# Patient Record
Sex: MALE | Race: WHITE | Employment: FULL TIME | URBAN - METROPOLITAN AREA
[De-identification: names, ages, dates, MRNs, and addresses within clinical notes are randomized per-mention and may not be internally consistent; named-entity substitution may affect disease eponyms.]

---

## 2019-03-22 ENCOUNTER — THERAPY VISIT (OUTPATIENT)
Dept: CHIROPRACTIC MEDICINE | Facility: CLINIC | Age: 34
End: 2019-03-22
Payer: COMMERCIAL

## 2019-03-22 DIAGNOSIS — M25.552 HIP PAIN, LEFT: ICD-10-CM

## 2019-03-22 DIAGNOSIS — M99.05 SOMATIC DYSFUNCTION OF PELVIS REGION: Primary | ICD-10-CM

## 2019-03-22 DIAGNOSIS — M79.10 MYALGIA: ICD-10-CM

## 2019-03-22 DIAGNOSIS — M25.562 CHRONIC PAIN OF LEFT KNEE: ICD-10-CM

## 2019-03-22 DIAGNOSIS — G89.29 CHRONIC PAIN OF LEFT KNEE: ICD-10-CM

## 2019-03-22 PROCEDURE — 99202 OFFICE O/P NEW SF 15 MIN: CPT | Mod: GA | Performed by: CHIROPRACTOR

## 2019-03-22 NOTE — PROGRESS NOTES
[facility]  Mar 22, 2019  My name is Maury Martinez and I was referred to you by my aunt, Angela Francois. She recommended that I reach out to you as her and her family have had great experiences working with you for athletic related injuries and pains.    I've been experiencing discomfort, pain, and tightness in the outside, lower part of my knee. The pain is manageable by limiting my activity level but I'd like to figure out what's specifically going on and how we can correct it. I'm fairly active playing soccer 2 times per week and participating in [employer] yoga or yoga sculpt 2 to 4 times per week. I'd like to also be able to run but that hasn't been possible for awhile now as the pain in the lower part of my knee creeps in rather quickly. The pain is deep within the knee and is mainly tingling and a deep, dull pain. It could possibly be ITB syndrome but I am fairly disciplined about stretching my calves, quads, and hamstrings, so I'm not sure whether that's it.    I should also mention that I saw a chiropractor this past fall and worked with him over nearly a dozen sessions. The symptoms did gradually improve but we never really figured out what was specifically happening and the issues never entirely went away. After talking with Angela, she thought it would make sense for me to reach out to you. Please let me know whether you have any availability for me to come in. Ideally, early morning, late afternoon, or early evening appointments work best with my schedule. I'm looking forward to hearing from you.    Pressure, tingling, worse sitting, pulled left glute area 10 years ago and gets some soreness, left calf tightness, left lateral hip  R 32/48    Subjective:  Maury Martinez   33 year old   male    CC: left lateral knee  Medications reviewed: denies  Visit: 1/6  Goal: stairs with no pain, stand for 30 minutes without pain, run back to back days  Location: L lateral knee  GABRIELA: 2.5 years after  soccer season, always achy sensation, doing too much  Pain: 4/10  Previous History: left hip strain, L calf tightness  Progression: slightly better  Quality: ache, tightness  Radiation:denies   Pain is worse with: running, standing for long period  Pain is better with: rest, massage  Timing: frequent pain   Under care: has seen chiropractor for this with some results  Imaging: denies  Social: alert, oriented, and active. Avid runner      Objective:  Inspection:  No SDD  No Scars  Normal Gait    Palpation:  No specific pain  Palpable soreness over L lateral knee pain  Myofascitis 2/4 noted over L VL, L IT, L BF    ROM:  Lumbar flexion   90/90  Lumbar extension  30/30  Right Hip IR  32/45  Left Hip IR  16/45  Right Hip ER  48/60  Left  hip ER  40/60  Terminal extension on left knee slightly limited with no pain  Knee flexion full and pain free    MMT:  Left glute med 4/5 with no pain  Right glute med 5/5 with no pain  Left TFL 4/5 with no pain  Right TFL 5/5 with no pain  Left piriformis 4/5 with no pain  Right piriformis 5/5 with no pain    MET:  Right short leg    Squat:  Shift to right  Foot flare to L    Lunge:  L knee genu valgum    NAL:  Restricted SI on L    Neuro:  Able to toe walk and heel walk  L4-S1 light touch WNL    Ortho:  SLR (-), IT compression (painful), Fader (mild anterior hip pain on L), standing single leg rotation (-), Rhina (-)  Assessment:  NAL with associated myofascitis and weakness  Knee pain, possible IT band   Hip pain     Plan:   Decrease pain 50%    Restore symmetric hip IR   Restore symmetric hip ER   Strengthen hip stabilizers to 5/5 B   Restore pelvic leveling   Restore segmental motion   Functional goals in history    Patient was given detailed history, review of symptoms, examination, functional examination, and report of findings. After this patient was treated with chiropractic adjustments, manual therapy, and therapeutic exercise. Patient tolerated treatment well. Patients  treatment plan with be 2 times per week for 2 weeks followed by 1 time per week for 2 weeks. Following treatment plan a follow up exam will be done to make sure patient is improving. Treatment frequency will degrease as patients subjective complaints improve as well as objective findings. Prognosis for care is good based on fact that patient is active and is willing to take active approach in care.     Patient tolerated treatment well today  Treatment Time: 45 minutes  80306 manipulation 1-2 segments: MET, Hip LAD  21746 manipulation: Manual extremity joint mobilization   70623 Manual therapy: (ART, Graston, Strain Counter Strain, Fascial Manipulation, Cupping) performed over area of L hip ER's, L BF, L VL  80745 therapeutic exercise (30 minutes): See below  Strapping: hip IR on L  Taping:  Next visit: 1 week  Other:  .  L-Protocol for nerve tension  https://[email or web address]?v=[email or web address]  -Follow protocol repetitions  -Really only need to do on affected side    2. Reverse Clam Shell  https://www.Briligube.com/watch?v=0xcY5cJLZG0&index=5&list=[email or web address]  -1 set of 30 (can break it up to 2 sets 15)  -Yellow band    3. Side Lying Leg Raise  https://www.Briligube.com/watch?v=azVsYMbP81U&index=7&list=[email or web address]  -No band necessary   -1 set of 30 (can break it up to 2 sets of 15)  -keep hip forward like in video    4. Standing Fire Hydrant   https://www.Briligube.com/watch?v=ezPQDcgNs5d  -Red band  -First motion is slight extension and then perform the fire hydrant  -hold position for 5 seconds  - 10-15 each leg    As for a follow up, I could do 5:00 this Wednesday in [place]. I have other options as well if that doesn't work for you. I would do the exercises every other day. Please let me know if you have any questions.  Darron Newby DC, MEd, ATC

## 2019-03-24 PROBLEM — M25.552 HIP PAIN, LEFT: Status: ACTIVE | Noted: 2019-03-24

## 2019-03-24 PROBLEM — M99.05 SOMATIC DYSFUNCTION OF PELVIS REGION: Status: ACTIVE | Noted: 2019-03-24

## 2019-03-24 PROBLEM — M25.562 CHRONIC PAIN OF LEFT KNEE: Status: ACTIVE | Noted: 2019-03-24

## 2019-03-24 PROBLEM — M79.10 MYALGIA: Status: ACTIVE | Noted: 2019-03-24

## 2019-03-24 PROBLEM — G89.29 CHRONIC PAIN OF LEFT KNEE: Status: ACTIVE | Noted: 2019-03-24

## 2019-03-27 ENCOUNTER — THERAPY VISIT (OUTPATIENT)
Dept: CHIROPRACTIC MEDICINE | Facility: CLINIC | Age: 34
End: 2019-03-27
Payer: COMMERCIAL

## 2019-03-27 DIAGNOSIS — M25.562 CHRONIC PAIN OF LEFT KNEE: ICD-10-CM

## 2019-03-27 DIAGNOSIS — M99.05 SOMATIC DYSFUNCTION OF PELVIS REGION: Primary | ICD-10-CM

## 2019-03-27 DIAGNOSIS — M25.552 HIP PAIN, LEFT: ICD-10-CM

## 2019-03-27 DIAGNOSIS — G89.29 CHRONIC PAIN OF LEFT KNEE: ICD-10-CM

## 2019-03-27 DIAGNOSIS — M79.10 MYALGIA: ICD-10-CM

## 2019-03-27 PROCEDURE — 98940 CHIROPRACT MANJ 1-2 REGIONS: CPT | Mod: AT | Performed by: CHIROPRACTOR

## 2019-03-27 PROCEDURE — 97110 THERAPEUTIC EXERCISES: CPT | Performed by: CHIROPRACTOR

## 2019-03-27 NOTE — PROGRESS NOTES
[facility]  Mar 22, 2019  My name is Maury Martinez and I was referred to you by my aunt, Angela Francois. She recommended that I reach out to you as her and her family have had great experiences working with you for athletic related injuries and pains.    I've been experiencing discomfort, pain, and tightness in the outside, lower part of my knee. The pain is manageable by limiting my activity level but I'd like to figure out what's specifically going on and how we can correct it. I'm fairly active playing soccer 2 times per week and participating in [employer] yoga or yoga sculpt 2 to 4 times per week. I'd like to also be able to run but that hasn't been possible for awhile now as the pain in the lower part of my knee creeps in rather quickly. The pain is deep within the knee and is mainly tingling and a deep, dull pain. It could possibly be ITB syndrome but I am fairly disciplined about stretching my calves, quads, and hamstrings, so I'm not sure whether that's it.    I should also mention that I saw a chiropractor this past fall and worked with him over nearly a dozen sessions. The symptoms did gradually improve but we never really figured out what was specifically happening and the issues never entirely went away. After talking with Angela, she thought it would make sense for me to reach out to you. Please let me know whether you have any availability for me to come in. Ideally, early morning, late afternoon, or early evening appointments work best with my schedule. I'm looking forward to hearing from you.    Pressure, tingling, worse sitting, pulled left glute area 10 years ago and gets some soreness, left calf tightness, left lateral hip  R 32/48    Subjective:  Maury Martinez   33 year old   male    CC: left lateral knee  Medications reviewed: denies  Visit: 1/6  Goal: stairs with no pain, stand for 30 minutes without pain, run back to back days  Location: L lateral knee  GABRIELA: 2.5 years after  soccer season, always achy sensation, doing too much  Pain: 4/10  Previous History: left hip strain, L calf tightness  Progression: slightly better  Quality: ache, tightness  Radiation:denies   Pain is worse with: running, standing for long period  Pain is better with: rest, massage  Timing: frequent pain   Under care: has seen chiropractor for this with some results  Imaging: denies  Social: alert, oriented, and active. Avid runner    Comes in today doing OK. Notes played 60 minutes on knee and felt good. Notes that knee felt pretty good. Notes hips are little sore. Pleased with first session and would like to continue.       Objective:  Inspection:  No SDD  No Scars  Normal Gait    Palpation:  No specific pain  Palpable soreness over L lateral knee pain  Myofascitis 2/4 noted over L VL, L IT, L BF    ROM:  Lumbar flexion   90/90  Lumbar extension  30/30  Right Hip IR  32/45  Left Hip IR  16/45, 26 post  Right Hip ER  48/60  Left  hip ER  40/60  Terminal extension on left knee slightly limited with no pain  Knee flexion full and pain free    MMT:  Left glute med 4/5 with no pain  Right glute med 5/5 with no pain  Left TFL 4/5 with no pain  Right TFL 5/5 with no pain  Left piriformis 4/5 with no pain  Right piriformis 5/5 with no pain    MET:  Right short leg    Squat:  Shift to right  Foot flare to L    Lunge:  L knee genu valgum    NAL:  Restricted SI on L    Neuro:  Able to toe walk and heel walk  L4-S1 light touch WNL    Ortho:  SLR (-), IT compression (painful), Fader (mild anterior hip pain on L), standing single leg rotation (-), Rhina (-)  Assessment:  NAL with associated myofascitis and weakness  Knee pain, possible IT band   Hip pain     Plan:   Decrease pain 50%    Restore symmetric hip IR   Restore symmetric hip ER   Strengthen hip stabilizers to 5/5 B   Restore pelvic leveling   Restore segmental motion   Functional goals in history    Patient was given detailed history, review of symptoms,  examination, functional examination, and report of findings. After this patient was treated with chiropractic adjustments, manual therapy, and therapeutic exercise. Patient tolerated treatment well. Patients treatment plan with be 2 times per week for 2 weeks followed by 1 time per week for 2 weeks. Following treatment plan a follow up exam will be done to make sure patient is improving. Treatment frequency will degrease as patients subjective complaints improve as well as objective findings. Prognosis for care is good based on fact that patient is active and is willing to take active approach in care.     Patient tolerated treatment well today  Treatment Time: 45 minutes  25916 manipulation 1-2 segments: MET, Hip LAD  32984 manipulation: Manual extremity joint mobilization   57346 Manual therapy: (ART, Graston, Strain Counter Strain, Fascial Manipulation, Cupping) performed over area of L hip ER's, L BF, L VL  30802 therapeutic exercise (30 minutes): See below  Strapping: hip IR on L  Taping:  Next visit: 1 week  Other:  .  L-Protocol for nerve tension  https://www.youtube.com/watch?v=ONCSNxmQTzE  -Follow protocol repetitions  -Really only need to do on affected side    2. Reverse Clam Shell  https://www.youtube.com/watch?v=5mhP6lPHXS1&index=5&list=[email or web address]  -1 set of 30 (can break it up to 2 sets 15)  -Yellow band    3. Side Lying Leg Raise  https://www.Student Ret[employer] Solutionstube.com/watch?v=bkHsHMlI16V&index=7&list=[email or web address]  -No band necessary   -1 set of 30 (can break it up to 2 sets of 15)  -keep hip forward like in video    4. Standing Fire Hydrant   https://www.Spendjiube.com/watch?v=clAADqvRm6i  -Red band  -First motion is slight extension and then perform the fire hydrant  -hold position for 5 seconds  - 10-15 each leg      Kevin Wong DC, MEd, ATC

## 2019-04-11 ENCOUNTER — THERAPY VISIT (OUTPATIENT)
Dept: CHIROPRACTIC MEDICINE | Facility: CLINIC | Age: 34
End: 2019-04-11
Payer: COMMERCIAL

## 2019-04-11 DIAGNOSIS — M99.05 SOMATIC DYSFUNCTION OF PELVIS REGION: Primary | ICD-10-CM

## 2019-04-11 DIAGNOSIS — G89.29 CHRONIC PAIN OF LEFT KNEE: ICD-10-CM

## 2019-04-11 DIAGNOSIS — M25.552 HIP PAIN, LEFT: ICD-10-CM

## 2019-04-11 DIAGNOSIS — M79.10 MYALGIA: ICD-10-CM

## 2019-04-11 DIAGNOSIS — M25.562 CHRONIC PAIN OF LEFT KNEE: ICD-10-CM

## 2019-04-11 PROCEDURE — 97110 THERAPEUTIC EXERCISES: CPT | Performed by: CHIROPRACTOR

## 2019-04-11 PROCEDURE — 98940 CHIROPRACT MANJ 1-2 REGIONS: CPT | Mod: AT | Performed by: CHIROPRACTOR

## 2019-04-11 NOTE — PROGRESS NOTES
[facility]  Mar 22, 2019    Subjective:  Maury Martinez   33 year old   male    CC: left lateral knee  Medications reviewed: willi  Visit: 2/6  Goal: stairs with no pain, stand for 30 minutes without pain, run back to back days  Location: L lateral knee  GABRIELA: 2.5 years after soccer season, always achy sensation, doing too much  Pain: 4/10  Previous History: left hip strain, L calf tightness    Played 2 indoor 60 minute sessions. Notes that has been very active with that before that. Notes had similar knee symptom happened at end of last games. Notes right anterior hip and left lateral hip. Did not do any rehab while skiing. Notes Glide on L protocol is sore. Overall he is better but is nervous about how he had pain again even though he admits that he did too much. We talked about going into Ortho to help him with his anxiety about what is wrong and he agreed. He is going to make appointment with Dr. Wellington.     Objective:  Inspection:  No SDD  No Scars  Normal Gait    Palpation:  No specific pain  Palpable soreness over L lateral knee pain  Myofascitis 2/4 noted over L VL, L IT, L BF    ROM:  Lumbar flexion   90/90  Lumbar extension  30/30  Right Hip IR  32/45  Left Hip IR  16/45, 29 post  Right Hip ER  48/60  Left  hip ER  40/60  Terminal extension on left knee slightly limited with no pain  Knee flexion full and pain free    MMT:  Left glute med 4/5 with no pain  Right glute med 5/5 with no pain  Left TFL 4/5 with no pain  Right TFL 5/5 with no pain  Left piriformis 4/5 with no pain  Right piriformis 5/5 with no pain    MET:  Right short leg    Squat:  Shift to right  Foot flare to L    Lunge:  L knee genu valgum    NAL:  Restricted SI on L    Neuro:  Able to toe walk and heel walk  L4-S1 light touch WNL    Ortho:  SLR (-), IT compression (painful), Fader (mild anterior hip pain on L), standing single leg rotation (-), Rhina (-)  Assessment:  NAL with associated myofascitis and  weakness  Knee pain, possible IT band   Hip pain     Plan:  Patient tolerated treatment well today  Treatment Time: 45 minutes  91375 manipulation 1-2 segments: MET, Hip LAD  27404 manipulation: Manual extremity joint mobilization   34403 Manual therapy: (ART, Graston, Strain Counter Strain, Fascial Manipulation, Cupping) performed over area of L hip ER's, L BF, L VL  16005 therapeutic exercise (30 minutes): See below  Strapping: hip IR on L  Dry needle: lateral quad, IT band  Shock wave: 15/6 over lateral quad  Taping:  Next visit: 1 week  Other:  .  L-Protocol for nerve tension  https://www.BodBotube.com/watch?v=ONCSNxmQTzE  -Follow protocol repetitions  -Really only need to do on affected side    2. Reverse Clam Shell  https://www.BodBotube.com/watch?v=6obB3gVDJB0&index=5&list=[email or web address]  -1 set of 30 (can break it up to 2 sets 15)  -Yellow band    3. Side Lying Leg Raise  https://www.BodBotube.com/watch?v=uhWlRMhJ56Y&index=7&list=[email or web address]  -No band necessary   -1 set of 30 (can break it up to 2 sets of 15)  -keep hip forward like in video    4. Standing Fire Hydrant   https://www.BodBotube.com/watch?v=exOTQemYz5t  -Red band  -First motion is slight extension and then perform the fire hydrant  -hold position for 5 seconds  - 10-15 each le    Band walks forward and sideways, single leg sit back, single leg step down      Kevin Wong DC, MEd, ATC

## 2019-07-28 ENCOUNTER — THERAPY VISIT (OUTPATIENT)
Dept: CHIROPRACTIC MEDICINE | Facility: CLINIC | Age: 34
End: 2019-07-28
Payer: COMMERCIAL

## 2019-07-28 DIAGNOSIS — M79.10 MYALGIA: ICD-10-CM

## 2019-07-28 DIAGNOSIS — M99.05 SOMATIC DYSFUNCTION OF PELVIS REGION: Primary | ICD-10-CM

## 2019-07-28 DIAGNOSIS — M25.552 HIP PAIN, LEFT: ICD-10-CM

## 2019-07-28 DIAGNOSIS — G57.12 MERALGIA PARAESTHETICA, LEFT: ICD-10-CM

## 2019-07-28 DIAGNOSIS — G89.29 CHRONIC PAIN OF LEFT KNEE: ICD-10-CM

## 2019-07-28 DIAGNOSIS — S76.219A: ICD-10-CM

## 2019-07-28 DIAGNOSIS — M25.562 CHRONIC PAIN OF LEFT KNEE: ICD-10-CM

## 2019-07-28 PROCEDURE — 97110 THERAPEUTIC EXERCISES: CPT | Performed by: CHIROPRACTOR

## 2019-07-28 PROCEDURE — 98940 CHIROPRACT MANJ 1-2 REGIONS: CPT | Mod: AT | Performed by: CHIROPRACTOR

## 2019-07-28 NOTE — PROGRESS NOTES
[facility]    Subjective:  Maury Martinez   33 year old   male    CC: L knee pain, R adductor pain  Visit 3  Since last visit went into MD and and was diagnosed with meralgia paresthesia (LFCN). Was given block with little to no change. Over last month has been on Tita which is helping. Notes he is suppose to be on this for 3 months and then have re-check. Knee is doing better. Still feels left lateral hip tightness. Reports today though for some R adductor tightness. No specific GABRIELA. Gradual pain over last couple weeks. No pain coughing or sneezing. Has not noticed any swelling or bruising. Pain is 3/10 and worse with cutting movements. He has been working on active care from previous and notes that helps. Denies any other changes in health history.     Objective:  Inspection:  No SDD  No Scars  Normal Gait    Palpation:  No specific pain  Palpable soreness over R adductor, L lateral hip   Myofascitis 2/4 noted over L TFL, R adductor    ROM:  Lumbar flexion   90/90  Lumbar extension  30/30  Right Hip IR  32/45  Left Hip IR  29/45  Right Hip ER  48/60  Left  hip ER  40/60  Hip abduction limited on R 6 degrees with mild adductor discomfort   Hip adduction limited on L 7 degrees with mild lateral hip pain on L    MMT:  Left glute med 4/5 with no pain  Right glute med 5/5 with no pain  Left TFL 4/5 with lateral hip tightness   Right TFL 5/5 with no pain  Left piriformis 4/5 with no pain  Right piriformis 5/5 with no pain  Adductor 4/5 on R with adductor discomfort     MET:  Right short leg    Squat:  Shift to right  Foot flare to L    Lunge:  L knee genu valgum    NAL:  Restricted SI on L    Neuro:  Able to toe walk and heel walk  L4-S1 light touch WNL      Assessment:  NAL with associated myofascitis and weakness  Adductor strain   Hip pain     Plan:  Patient tolerated treatment well today  Treatment Time: 45 minutes  01450 manipulation 1-2 segments: MET, Hip LAD  12943 manipulation: Manual  extremity joint mobilization   87545 Manual therapy: (ART, Graston, Strain Counter Strain, Fascial Manipulation, Cupping) performed over area of L TFL, R adductors  36097 therapeutic exercise (30 minutes): [place] side plank, bottom leg hip ER's  Strapping: hip IR on L  Dry needle: did not do today  Shock wave: 15/6 over lateral quad (did not do)  Taping:  Next visit: 2 week          Previous rehab:    Other:  .  L-Protocol for nerve tension  https://www.[employer] Technologiesube.com/watch?v=ONCSNxmQTzE  -Follow protocol repetitions  -Really only need to do on affected side    2. Reverse Clam Shell  https://www.[employer] Technologiesube.com/watch?v=9xlM1kODBM5&index=5&list=[email or web address]  -1 set of 30 (can break it up to 2 sets 15)  -Yellow band    3. Side Lying Leg Raise  https://www.Re-[employer] Technologiesube.com/watch?v=kqJhYYrU68D&index=7&list=[email or web address]  -No band necessary   -1 set of 30 (can break it up to 2 sets of 15)  -keep hip forward like in video    4. Standing Fire Hydrant   https://www.[employer] Technologiesube.com/watch?v=kzVNLzrFf5d  -Red band  -First motion is slight extension and then perform the fire hydrant  -hold position for 5 seconds  - 10-15 each le    Band walks forward and sideways, single leg sit back, single leg step down      Kevin Wong DC, MEd, ATC

## 2019-08-01 PROBLEM — G57.12 MERALGIA PARAESTHETICA, LEFT: Status: ACTIVE | Noted: 2019-08-01

## 2019-08-01 PROBLEM — S76.219A: Status: ACTIVE | Noted: 2019-08-01

## 2019-08-09 ENCOUNTER — THERAPY VISIT (OUTPATIENT)
Dept: CHIROPRACTIC MEDICINE | Facility: CLINIC | Age: 34
End: 2019-08-09
Payer: COMMERCIAL

## 2019-08-09 DIAGNOSIS — M25.552 HIP PAIN, LEFT: ICD-10-CM

## 2019-08-09 DIAGNOSIS — M79.10 MYALGIA: ICD-10-CM

## 2019-08-09 DIAGNOSIS — G89.29 CHRONIC PAIN OF LEFT KNEE: ICD-10-CM

## 2019-08-09 DIAGNOSIS — M25.562 CHRONIC PAIN OF LEFT KNEE: ICD-10-CM

## 2019-08-09 DIAGNOSIS — M99.05 SOMATIC DYSFUNCTION OF PELVIS REGION: Primary | ICD-10-CM

## 2019-08-09 DIAGNOSIS — G57.12 MERALGIA PARAESTHETICA, LEFT: ICD-10-CM

## 2019-08-09 DIAGNOSIS — S76.219A: ICD-10-CM

## 2019-08-09 PROCEDURE — 97110 THERAPEUTIC EXERCISES: CPT | Performed by: CHIROPRACTOR

## 2019-08-09 PROCEDURE — 98940 CHIROPRACT MANJ 1-2 REGIONS: CPT | Mod: AT | Performed by: CHIROPRACTOR

## 2019-08-09 NOTE — PROGRESS NOTES
[facility]    Subjective:  Maury Martinez   33 year old   male    CC: L knee pain, R adductor pain  Visit 5    Comes in today doing well. Was getting better. Took off 1 week. NO pain with active care. Played 25 minutes in game and felt good. Played 65 minutes in second game. In end started to have some pain over right adductor. Pleased that is able to play. Notes that knee is doing very well. Active care is going well and pleased with progress.       Objective:  Inspection:  No SDD  No Scars  Normal Gait    Palpation:  No specific pain  Palpable soreness over R adductor, L lateral hip   Myofascitis 2/4 noted over L TFL, R adductor    ROM:  Lumbar flexion   90/90  Lumbar extension  30/30  Right Hip IR  32/45  Left Hip IR  29/45  Right Hip ER  48/60  Left  hip ER  40/60  Hip abduction limited on R 6 degrees with mild adductor discomfort   Hip adduction limited on L 7 degrees with mild lateral hip pain on L    MMT:  Left glute med 4/5 with no pain  Right glute med 5/5 with no pain  Left TFL 4/5 with lateral hip tightness   Right TFL 5/5 with no pain  Left piriformis 4/5 with no pain  Right piriformis 5/5 with no pain  Adductor 4/5 on R with adductor discomfort     MET:  Right short leg    Squat:  Shift to right  Foot flare to L    Lunge:  L knee genu valgum    NAL:  Restricted SI on L    Neuro:  Able to toe walk and heel walk  L4-S1 light touch WNL      Assessment:  NAL with associated myofascitis and weakness  Adductor strain   Hip pain     Plan:  Patient tolerated treatment well today  Treatment Time: 45 minutes  56203 manipulation 1-2 segments: MET, Hip LAD  23312 manipulation: Manual extremity joint mobilization   23238 Manual therapy: (ART, Graston, Strain Counter Strain, Fascial Manipulation, Cupping) performed over area of L TFL, R adductors  96451 therapeutic exercise (30 minutes): [place] side plank, bottom leg hip ER's, side lunge  Strapping: hip IR on L  Dry needle: R adductor (5 1.5  inches) tolerated well  Shock wave: 15/6 over lateral quad (did not do)  Taping:  Next visit: 2 week          Previous rehab:    Other:  .  L-Protocol for nerve tension  https://www.File[employer] Document Management softwareube.com/watch?v=ONCSNxmQTzE  -Follow protocol repetitions  -Really only need to do on affected side    2. Reverse Clam Shell  https://www.File[employer] Management softwareube.com/watch?v=4gbC3rSWDS2&index=5&list=MFaBoAjoj5cOywQjcZe7gdQwVXHDI7OLX6  -1 set of 30 (can break it up to 2 sets 15)  -Yellow band    3. Side Lying Leg Raise  https://www.File[employer] Document Management softwareube.com/watch?v=cmQtQEmU47L&index=7&list=YTcTv[email or web address]  -No band necessary   -1 set of 30 (can break it up to 2 sets of 15)  -keep hip forward like in video    4. Standing Fire Hydrant   https://www.File[employer] Document Management softwareube.com/watch?v=bmISYkqGw2g  -Red band  -First motion is slight extension and then perform the fire hydrant  -hold position for 5 seconds  - 10-15 each le    Band walks forward and sideways, single leg sit back, single leg step down      Kevin Wong DC, MEd, ATC

## 2020-03-22 ENCOUNTER — HEALTH MAINTENANCE LETTER (OUTPATIENT)
Age: 35
End: 2020-03-22

## 2021-01-15 ENCOUNTER — HEALTH MAINTENANCE LETTER (OUTPATIENT)
Age: 36
End: 2021-01-15

## 2021-05-16 ENCOUNTER — HEALTH MAINTENANCE LETTER (OUTPATIENT)
Age: 36
End: 2021-05-16

## 2021-09-05 ENCOUNTER — HEALTH MAINTENANCE LETTER (OUTPATIENT)
Age: 36
End: 2021-09-05

## 2022-06-12 ENCOUNTER — HEALTH MAINTENANCE LETTER (OUTPATIENT)
Age: 37
End: 2022-06-12

## 2022-10-22 ENCOUNTER — HEALTH MAINTENANCE LETTER (OUTPATIENT)
Age: 37
End: 2022-10-22

## 2023-06-18 ENCOUNTER — HEALTH MAINTENANCE LETTER (OUTPATIENT)
Age: 38
End: 2023-06-18

## 2024-08-11 ENCOUNTER — HEALTH MAINTENANCE LETTER (OUTPATIENT)
Age: 39
End: 2024-08-11

## 2025-07-25 ENCOUNTER — MEDICAL CORRESPONDENCE (OUTPATIENT)
Dept: HEALTH INFORMATION MANAGEMENT | Facility: CLINIC | Age: 40
End: 2025-07-25
Payer: COMMERCIAL

## 2025-07-28 DIAGNOSIS — E78.00 HIGH CHOLESTEROL: Primary | ICD-10-CM

## 2025-08-17 ENCOUNTER — HEALTH MAINTENANCE LETTER (OUTPATIENT)
Age: 40
End: 2025-08-17